# Patient Record
Sex: FEMALE | URBAN - METROPOLITAN AREA
[De-identification: names, ages, dates, MRNs, and addresses within clinical notes are randomized per-mention and may not be internally consistent; named-entity substitution may affect disease eponyms.]

---

## 2022-05-10 ENCOUNTER — OFFICE VISIT (OUTPATIENT)
Dept: URBAN - METROPOLITAN AREA CLINIC 76 | Facility: CLINIC | Age: 87
End: 2022-05-10
Payer: MEDICARE

## 2022-05-10 DIAGNOSIS — H00.021 HORDEOLUM INTERNUM RIGHT UPPER EYELID: Primary | ICD-10-CM

## 2022-05-10 DIAGNOSIS — Z96.1 PRESENCE OF INTRAOCULAR LENS: ICD-10-CM

## 2022-05-10 DIAGNOSIS — H00.15 CHALAZION LEFT LOWER EYELID: ICD-10-CM

## 2022-05-10 PROCEDURE — 99203 OFFICE O/P NEW LOW 30 MIN: CPT | Performed by: OPTOMETRIST

## 2022-05-10 RX ORDER — NEOMYCIN SULFATE, POLYMYXIN B SULFATE AND DEXAMETHASONE 3.5; 10000; 1 MG/ML; [USP'U]/ML; MG/ML
SUSPENSION OPHTHALMIC
Qty: 5 | Refills: 0 | Status: INACTIVE
Start: 2022-05-10 | End: 2022-05-16

## 2022-05-10 ASSESSMENT — INTRAOCULAR PRESSURE
OD: 13
OS: 14

## 2022-05-10 NOTE — IMPRESSION/PLAN
Impression: Hordeolum internum right upper eyelid: H00.021. had eye exam in beginning of 2022. Plan: Discussed condition. Recommend alternating heat (i.e. water bottle) and pressure for 5 min QID until gone. Maxitrol gtt QID OD for 1 week, glc precautions given. Pt to call with concerns or if symptoms do not improve or worsen. Will  consider oral antibiotic if no improvement.

## 2022-05-10 NOTE — IMPRESSION/PLAN
Impression: Chalazion left lower eyelid: H00.15. Plan: Can alternate heat and pressure treatment, continue to monitor. Pt to call with concerns.

## 2023-02-01 ENCOUNTER — OFFICE VISIT (OUTPATIENT)
Dept: URBAN - METROPOLITAN AREA CLINIC 81 | Facility: CLINIC | Age: 88
End: 2023-02-01
Payer: MEDICARE

## 2023-02-01 DIAGNOSIS — Z96.1 PRESENCE OF INTRAOCULAR LENS: ICD-10-CM

## 2023-02-01 DIAGNOSIS — H53.2 DIPLOPIA: ICD-10-CM

## 2023-02-01 DIAGNOSIS — H35.3130 NONEXUDATIVE AGE-RELATED MACULAR DEGENERATION, BILATERAL, STAGE UNSPECIFIED: Primary | ICD-10-CM

## 2023-02-01 DIAGNOSIS — H52.4 PRESBYOPIA: ICD-10-CM

## 2023-02-01 PROCEDURE — 99214 OFFICE O/P EST MOD 30 MIN: CPT | Performed by: OPTOMETRIST

## 2023-02-01 ASSESSMENT — VISUAL ACUITY
OS: 20/60
OD: 20/60

## 2023-02-01 NOTE — IMPRESSION/PLAN
Impression: Nonexudative age-related macular degeneration, bilateral, stage unspecified: H35.3130.

-drusen OU, no hemes OU
-unable to perform OCT mac due to mobility Plan: Macular degeneration, dry type with stable vision. Education on dry versus wet ARMD. Patient advised to RTC immediately if any vision changes occur.